# Patient Record
Sex: MALE | Race: WHITE | NOT HISPANIC OR LATINO | Employment: OTHER | URBAN - METROPOLITAN AREA
[De-identification: names, ages, dates, MRNs, and addresses within clinical notes are randomized per-mention and may not be internally consistent; named-entity substitution may affect disease eponyms.]

---

## 2021-11-04 NOTE — PROCEDURE NOTE: CLINICAL
PROCEDURE NOTE: Avastin () #1 OS. Diagnosis: Diabetes, Type II with Ocular Complications. Anesthesia: Topical/Subconjunctival. Prep: Betadine Drops and Betadine Scrub. Intravitreal injection of Avastin 1.25mg/0.05 ml was given. Injection site: 3-4 mm from the limbus. Patient tolerated procedure well. CF vision checked. There were no complications. Post-op instructions given. Lot #: [id number]. Expiration Date: 1995-56-59Q19:00:00. Inventory Id: null. Lawton Bar

## 2021-11-04 NOTE — PATIENT DISCUSSION
There is central edema seen today in the left eye.  I recommended treatment today with Avastin in the left eye.  Avastin injection was done without complication.

## 2021-12-16 NOTE — PROCEDURE NOTE: CLINICAL
PROCEDURE NOTE: Avastin () #2 OS. Diagnosis: Diabetes, Type II with Ocular Complications. Anesthesia: Topical/Subconjunctival. Prep: Betadine Drops and Betadine Scrub. Intravitreal injection of Avastin 1.25mg/0.05 ml was given. Injection site: 3-4 mm from the limbus. Patient tolerated procedure well. CF vision checked. There were no complications. Post-op instructions given. Lot #: [id number]. Expiration Date: 1/17/2022. Inventory Id: [id number]

## 2021-12-16 NOTE — PATIENT DISCUSSION
I recommended two more Avastin injections  in the left eye on a monthly basis, then will schedule for Focal laser in her left eye.  Avastin injection was done without complication today.

## 2022-01-18 NOTE — PATIENT DISCUSSION
Mrs. Bryan Alcantara will return in 4-6 weeks for exam and possible Avastin.  We discussed possibly focal laser in the future as well.

## 2022-01-18 NOTE — PROCEDURE NOTE: CLINICAL
PROCEDURE NOTE: Avastin () #3 OS. Diagnosis: Diabetes, Type II with Ocular Complications. Anesthesia: Topical/Subconjunctival. Prep: Betadine Drops and Betadine Scrub. Betadine prep was performed. Product is within expiration date, and has been verified. An unopened 30 g needle was securely affixed to the 1 cc syringe. Excess drug and air bubbles were carefully expressed such that the plunger aligned with the .05 mL colin on the syringe. A lid speculum was used. After the Betadine prep, intravitreal injection of Avastin 1.25mg/0.05 ml was given. Injection site: 3-4 mm from the limbus. The needle was withdrawn; retinal perfusion was verified. Lid speculum removed. The eye was irrigated with sterile irrigating solution. The betadine was washed away. Patient tolerated procedure well. Count fingers vision was verified. There were no complications. Patient given office phone number/answering service phone number. Post-injection instructions were reviewed and understood. Symptoms of RD and endophthalmitis following intravitreal injection were discussed. Patient advised to call right away if any loss of vision, new floaters, or eye pain. Post-op instructions given. Patient was given the standard instruction sheet. Appropriate follow-up was arranged. Guillaume Beard

## 2022-02-24 NOTE — PATIENT DISCUSSION
There is improved contour today with patchy persistent edema seen in the left eye.  Trace edema seen in the right eye.

## 2022-02-24 NOTE — PATIENT DISCUSSION
Recommended Avastin injection today in the left eye The injection was given and tolerated well by patient. Post-injection instructions were reviewed and understood by the patient.

## 2022-02-24 NOTE — PATIENT DISCUSSION
I will plan two more Avastin injection in the left eye on a monthly basis.  We will follow up after that with Focal laser in her left eye.

## 2022-02-24 NOTE — PROCEDURE NOTE: CLINICAL
PROCEDURE NOTE: Avastin () #4 OS. Diagnosis: Diabetes, Type II with Ocular Complications. Anesthesia: Topical/Subconjunctival. Prep: Betadine Drops and Betadine Scrub. Betadine prep was performed. Product is within expiration date, and has been verified. An unopened 30 g needle was securely affixed to the 1 cc syringe. Excess drug and air bubbles were carefully expressed such that the plunger aligned with the .05 mL colin on the syringe. A lid speculum was used. After the Betadine prep, intravitreal injection of Avastin 1.25mg/0.05 ml was given. Injection site: 3-4 mm from the limbus. The needle was withdrawn; retinal perfusion was verified. Lid speculum removed. The eye was irrigated with sterile irrigating solution. The betadine was washed away. Patient tolerated procedure well. Count fingers vision was verified. There were no complications. Patient given office phone number/answering service phone number. Post-injection instructions were reviewed and understood. Symptoms of RD and endophthalmitis following intravitreal injection were discussed. Patient advised to call right away if any loss of vision, new floaters, or eye pain. Post-op instructions given. Patient was given the standard instruction sheet. Appropriate follow-up was arranged. Stephanie Whitman

## 2022-03-29 NOTE — PROCEDURE NOTE: CLINICAL
PROCEDURE NOTE: Avastin () #5 OS. Diagnosis: Diabetes, Type II with Ocular Complications. Anesthesia: Topical/Subconjunctival. Prep: Betadine Drops and Betadine Scrub. Betadine prep was performed. Product is within expiration date, and has been verified. An unopened 30 g needle was securely affixed to the 1 cc syringe. Excess drug and air bubbles were carefully expressed such that the plunger aligned with the .05 mL colin on the syringe. A lid speculum was used. After the Betadine prep, intravitreal injection of Avastin 1.25mg/0.05 ml was given. Injection site: 3-4 mm from the limbus. The needle was withdrawn; retinal perfusion was verified. Lid speculum removed. The eye was irrigated with sterile irrigating solution. The betadine was washed away. Patient tolerated procedure well. Count fingers vision was verified. There were no complications. Patient given office phone number/answering service phone number. Post-injection instructions were reviewed and understood. Symptoms of RD and endophthalmitis following intravitreal injection were discussed. Patient advised to call right away if any loss of vision, new floaters, or eye pain. Post-op instructions given. Patient was given the standard instruction sheet. Appropriate follow-up was arranged. Francisco Stokes

## 2022-04-26 NOTE — PATIENT DISCUSSION
Avastin injection today. The injection was given and tolerated well by patient. Post-injection instructions were reviewed and understood by the patient.

## 2022-04-26 NOTE — PATIENT DISCUSSION
Avastin #6/6 done today without complication.  Mrs. Silvia Modi will return in 4-6 weeks for exam and possible Avastin.

## 2022-04-26 NOTE — PROCEDURE NOTE: CLINICAL
PROCEDURE NOTE: Avastin () #6 OS. Diagnosis: Diabetes, Type II with Ocular Complications. Anesthesia: Topical/Subconjunctival. Prep: Betadine Drops and Betadine Scrub. Betadine prep was performed. Product is within expiration date, and has been verified. An unopened 30 g needle was securely affixed to the 1 cc syringe. Excess drug and air bubbles were carefully expressed such that the plunger aligned with the .05 mL colin on the syringe. A lid speculum was used. After the Betadine prep, intravitreal injection of Avastin 1.25mg/0.05 ml was given. Injection site: 3-4 mm from the limbus. The needle was withdrawn; retinal perfusion was verified. Lid speculum removed. The eye was irrigated with sterile irrigating solution. The betadine was washed away. Patient tolerated procedure well. Count fingers vision was verified. There were no complications. Patient given office phone number/answering service phone number. Post-injection instructions were reviewed and understood. Symptoms of RD and endophthalmitis following intravitreal injection were discussed. Patient advised to call right away if any loss of vision, new floaters, or eye pain. Post-op instructions given. Patient was given the standard instruction sheet. Appropriate follow-up was arranged. Keshawn Mullen

## 2022-06-21 NOTE — PROCEDURE NOTE: CLINICAL
PROCEDURE NOTE: Focal Laser, Retina #1 OS. Diagnosis: Diabetic Macular Edema. Anesthesia: Topical. Prior to focal laser, risks/benefits/alternatives to laser discussed including loss of vision and patient wished to proceed. Spot size: 100 um. Power: 100 mW. Number of pulses: 145. Patient tolerated procedure well. There were no complications. Post procedure instructions given. Douglas Cherry

## 2022-09-27 NOTE — PATIENT DISCUSSION
Please contact her to set up DR GISELLE PARMAR [facility], [place] [facility] at [phone].

## 2022-09-27 NOTE — PATIENT DISCUSSION
The patient is cleared for cataract surgery O[facility], you may discuss and proceed with this at your earliest convenience.

## 2023-01-03 NOTE — PROCEDURE NOTE: CLINICAL
PROCEDURE NOTE: Avastin () #6 OS. Diagnosis: Diabetic Macular Edema. Anesthesia: Topical/Subconjunctival. Prep: Betadine Drops and Betadine Scrub. Betadine prep was performed. Product is within expiration date, and has been verified. An unopened 30 g needle was securely affixed to the 1 cc syringe. Excess drug and air bubbles were carefully expressed such that the plunger aligned with the .05 mL colin on the syringe. After the Betadine prep, intravitreal injection of Avastin 1.25mg/0.05 ml was given. Injection site: 3-4 mm from the limbus. The needle was withdrawn; retinal perfusion was verified. The eye was irrigated with sterile irrigating solution. The betadine was washed away. Patient tolerated procedure well. There were no complications. Patient given office phone number/answering service phone number. Post-injection instructions were reviewed and understood. Symptoms of RD and endophthalmitis following intravitreal injection were discussed. Patient advised to call right away if any loss of vision, new floaters, or eye pain. Post-op instructions given. Patient was given the standard instruction sheet. Appropriate follow-up was arranged. Susanne Puckett

## 2023-01-03 NOTE — PATIENT DISCUSSION
Recommended Avastin injection #6/8 today. The injection was given and tolerated well by patient. Post-injection instructions were reviewed and understood by the patient. I will do two more injections on a monthly basis then have her return for some more focal laser treatment.

## 2023-01-16 ENCOUNTER — NEW PATIENT (OUTPATIENT)
Dept: URBAN - METROPOLITAN AREA CLINIC 47 | Facility: CLINIC | Age: 83
End: 2023-01-16

## 2023-01-16 DIAGNOSIS — H52.7: ICD-10-CM

## 2023-01-16 DIAGNOSIS — H25.813: ICD-10-CM

## 2023-01-16 PROCEDURE — 92004 COMPRE OPH EXAM NEW PT 1/>: CPT

## 2023-01-16 PROCEDURE — 92015 DETERMINE REFRACTIVE STATE: CPT

## 2023-01-16 ASSESSMENT — TONOMETRY
OS_IOP_MMHG: 18
OD_IOP_MMHG: 17

## 2023-01-16 ASSESSMENT — VISUAL ACUITY
OD_SC: <J12
OD_SC: 20/150-1
OS_SC: 20/80+2
OS_SC: J10

## 2023-04-06 ENCOUNTER — POST-OP (OUTPATIENT)
Dept: URBAN - METROPOLITAN AREA CLINIC 47 | Facility: CLINIC | Age: 83
End: 2023-04-06

## 2023-04-06 DIAGNOSIS — Z96.1: ICD-10-CM

## 2023-04-06 PROCEDURE — 99024 POSTOP FOLLOW-UP VISIT: CPT

## 2023-04-06 ASSESSMENT — VISUAL ACUITY
OU_SC: 20/20
OS_SC: 20/25-1
OD_SC: 20/40

## 2023-04-06 ASSESSMENT — TONOMETRY
OS_IOP_MMHG: 12
OD_IOP_MMHG: 11